# Patient Record
Sex: FEMALE | Race: OTHER | HISPANIC OR LATINO | URBAN - METROPOLITAN AREA
[De-identification: names, ages, dates, MRNs, and addresses within clinical notes are randomized per-mention and may not be internally consistent; named-entity substitution may affect disease eponyms.]

---

## 2018-03-15 ENCOUNTER — EMERGENCY (EMERGENCY)
Facility: HOSPITAL | Age: 48
LOS: 1 days | Discharge: HOME | End: 2018-03-15
Attending: STUDENT IN AN ORGANIZED HEALTH CARE EDUCATION/TRAINING PROGRAM | Admitting: STUDENT IN AN ORGANIZED HEALTH CARE EDUCATION/TRAINING PROGRAM
Payer: SELF-PAY

## 2018-03-15 VITALS
SYSTOLIC BLOOD PRESSURE: 191 MMHG | WEIGHT: 149.91 LBS | HEIGHT: 62.99 IN | DIASTOLIC BLOOD PRESSURE: 95 MMHG | TEMPERATURE: 98 F | HEART RATE: 106 BPM | OXYGEN SATURATION: 99 % | RESPIRATION RATE: 22 BRPM

## 2018-03-15 VITALS
SYSTOLIC BLOOD PRESSURE: 137 MMHG | OXYGEN SATURATION: 97 % | HEART RATE: 119 BPM | RESPIRATION RATE: 20 BRPM | DIASTOLIC BLOOD PRESSURE: 85 MMHG | TEMPERATURE: 98 F

## 2018-03-15 LAB
ALBUMIN SERPL ELPH-MCNC: 4.6 G/DL — SIGNIFICANT CHANGE UP (ref 3.3–5.2)
ALP SERPL-CCNC: 86 U/L — SIGNIFICANT CHANGE UP (ref 40–120)
ALT FLD-CCNC: 38 U/L — HIGH
ANION GAP SERPL CALC-SCNC: 15 MMOL/L — SIGNIFICANT CHANGE UP (ref 5–17)
AST SERPL-CCNC: 24 U/L — SIGNIFICANT CHANGE UP
BASOPHILS # BLD AUTO: 0 K/UL — SIGNIFICANT CHANGE UP (ref 0–0.2)
BASOPHILS NFR BLD AUTO: 0.2 % — SIGNIFICANT CHANGE UP (ref 0–2)
BILIRUB SERPL-MCNC: 0.4 MG/DL — SIGNIFICANT CHANGE UP (ref 0.4–2)
BUN SERPL-MCNC: 11 MG/DL — SIGNIFICANT CHANGE UP (ref 8–20)
CALCIUM SERPL-MCNC: 9.5 MG/DL — SIGNIFICANT CHANGE UP (ref 8.6–10.2)
CHLORIDE SERPL-SCNC: 104 MMOL/L — SIGNIFICANT CHANGE UP (ref 98–107)
CK SERPL-CCNC: 106 U/L — SIGNIFICANT CHANGE UP (ref 25–170)
CO2 SERPL-SCNC: 24 MMOL/L — SIGNIFICANT CHANGE UP (ref 22–29)
CREAT SERPL-MCNC: 0.8 MG/DL — SIGNIFICANT CHANGE UP (ref 0.5–1.3)
CRP SERPL-MCNC: <0.4 MG/DL — SIGNIFICANT CHANGE UP (ref 0–0.4)
D DIMER BLD IA.RAPID-MCNC: <150 NG/ML DDU — SIGNIFICANT CHANGE UP
EOSINOPHIL # BLD AUTO: 0.1 K/UL — SIGNIFICANT CHANGE UP (ref 0–0.5)
EOSINOPHIL NFR BLD AUTO: 1.6 % — SIGNIFICANT CHANGE UP (ref 0–6)
GLUCOSE SERPL-MCNC: 119 MG/DL — HIGH (ref 70–115)
HCT VFR BLD CALC: 39.7 % — SIGNIFICANT CHANGE UP (ref 37–47)
HGB BLD-MCNC: 13 G/DL — SIGNIFICANT CHANGE UP (ref 12–16)
LIDOCAIN IGE QN: 40 U/L — SIGNIFICANT CHANGE UP (ref 22–51)
LYMPHOCYTES # BLD AUTO: 1.3 K/UL — SIGNIFICANT CHANGE UP (ref 1–4.8)
LYMPHOCYTES # BLD AUTO: 21 % — SIGNIFICANT CHANGE UP (ref 20–55)
MAGNESIUM SERPL-MCNC: 2.1 MG/DL — SIGNIFICANT CHANGE UP (ref 1.6–2.6)
MCHC RBC-ENTMCNC: 29.5 PG — SIGNIFICANT CHANGE UP (ref 27–31)
MCHC RBC-ENTMCNC: 32.7 G/DL — SIGNIFICANT CHANGE UP (ref 32–36)
MCV RBC AUTO: 90.2 FL — SIGNIFICANT CHANGE UP (ref 81–99)
MONOCYTES # BLD AUTO: 0.3 K/UL — SIGNIFICANT CHANGE UP (ref 0–0.8)
MONOCYTES NFR BLD AUTO: 5.4 % — SIGNIFICANT CHANGE UP (ref 3–10)
NEUTROPHILS # BLD AUTO: 4.5 K/UL — SIGNIFICANT CHANGE UP (ref 1.8–8)
NEUTROPHILS NFR BLD AUTO: 71.3 % — SIGNIFICANT CHANGE UP (ref 37–73)
NT-PROBNP SERPL-SCNC: 35 PG/ML — SIGNIFICANT CHANGE UP (ref 0–300)
PLATELET # BLD AUTO: 331 K/UL — SIGNIFICANT CHANGE UP (ref 150–400)
POTASSIUM SERPL-MCNC: 4 MMOL/L — SIGNIFICANT CHANGE UP (ref 3.5–5.3)
POTASSIUM SERPL-SCNC: 4 MMOL/L — SIGNIFICANT CHANGE UP (ref 3.5–5.3)
PROT SERPL-MCNC: 8.1 G/DL — SIGNIFICANT CHANGE UP (ref 6.6–8.7)
RBC # BLD: 4.4 M/UL — SIGNIFICANT CHANGE UP (ref 4.4–5.2)
RBC # FLD: 12.8 % — SIGNIFICANT CHANGE UP (ref 11–15.6)
SODIUM SERPL-SCNC: 143 MMOL/L — SIGNIFICANT CHANGE UP (ref 135–145)
TROPONIN T SERPL-MCNC: <0.01 NG/ML — SIGNIFICANT CHANGE UP (ref 0–0.06)
WBC # BLD: 6.3 K/UL — SIGNIFICANT CHANGE UP (ref 4.8–10.8)
WBC # FLD AUTO: 6.3 K/UL — SIGNIFICANT CHANGE UP (ref 4.8–10.8)

## 2018-03-15 PROCEDURE — 99285 EMERGENCY DEPT VISIT HI MDM: CPT | Mod: 25

## 2018-03-15 PROCEDURE — 85027 COMPLETE CBC AUTOMATED: CPT

## 2018-03-15 PROCEDURE — 36415 COLL VENOUS BLD VENIPUNCTURE: CPT

## 2018-03-15 PROCEDURE — 83690 ASSAY OF LIPASE: CPT

## 2018-03-15 PROCEDURE — 80053 COMPREHEN METABOLIC PANEL: CPT

## 2018-03-15 PROCEDURE — 71046 X-RAY EXAM CHEST 2 VIEWS: CPT | Mod: 26

## 2018-03-15 PROCEDURE — 93005 ELECTROCARDIOGRAM TRACING: CPT

## 2018-03-15 PROCEDURE — 99053 MED SERV 10PM-8AM 24 HR FAC: CPT

## 2018-03-15 PROCEDURE — 83735 ASSAY OF MAGNESIUM: CPT

## 2018-03-15 PROCEDURE — 83880 ASSAY OF NATRIURETIC PEPTIDE: CPT

## 2018-03-15 PROCEDURE — 93010 ELECTROCARDIOGRAM REPORT: CPT

## 2018-03-15 PROCEDURE — 86140 C-REACTIVE PROTEIN: CPT

## 2018-03-15 PROCEDURE — 71046 X-RAY EXAM CHEST 2 VIEWS: CPT

## 2018-03-15 PROCEDURE — 82550 ASSAY OF CK (CPK): CPT

## 2018-03-15 PROCEDURE — 85379 FIBRIN DEGRADATION QUANT: CPT

## 2018-03-15 PROCEDURE — 84484 ASSAY OF TROPONIN QUANT: CPT

## 2018-03-15 PROCEDURE — 99284 EMERGENCY DEPT VISIT MOD MDM: CPT | Mod: 25

## 2018-03-15 RX ORDER — SODIUM CHLORIDE 9 MG/ML
3 INJECTION INTRAMUSCULAR; INTRAVENOUS; SUBCUTANEOUS ONCE
Qty: 0 | Refills: 0 | Status: COMPLETED | OUTPATIENT
Start: 2018-03-15 | End: 2018-03-15

## 2018-03-15 RX ADMIN — SODIUM CHLORIDE 3 MILLILITER(S): 9 INJECTION INTRAMUSCULAR; INTRAVENOUS; SUBCUTANEOUS at 03:33

## 2018-03-15 NOTE — ED PROVIDER NOTE - MEDICAL DECISION MAKING DETAILS
Will evaluate patients palpitations r/o acute cardiac injury. If normal pt to be discharge and f/u with cardiology out patient.

## 2018-03-15 NOTE — ED ADULT NURSE NOTE - CHPI ED SYMPTOMS NEG
no vomiting/no back pain/no chills/no diaphoresis/no syncope/no nausea/no shortness of breath/no cough/no fever/no dizziness

## 2018-03-15 NOTE — ED ADULT NURSE NOTE - OBJECTIVE STATEMENT
Pt. presents to ED with c/o palpitations onset appx 2 days ago. Pt. traveled from [facility] 3 months ago, denies any chest pain. admits to occasional headache. Palpitations bernstein not radiate. no other complaints at this time.

## 2018-03-15 NOTE — ED PROVIDER NOTE - OBJECTIVE STATEMENT
This is a 48 y/o F presents to ED c/o intermittent palpitations for the past two days but worsening today. States there are no alleviating or exacerbating factors. Reports she was trying to go to sleep but she felt palpitations onset and became diaphoretic with a mild HA prompting her presentation to ED. Son at bedside notes approximately 8 months ago pt was admitted to a hospital in [facility] for tachycardia and having an elevated BP. She was told her doctor had her on too high of a dosage for one of her medications. Pt is a non-drinker and non-smoker. Denies having recent medication change, diet change N/V/D, fever, chills, SOB, CP, difficulty breathing, HA, numbness, tingling and abd pain.   : Sanjuanita This is a 48 y/o F presents to ED c/o intermittent ( but more persistent) palpitations for the past two days but worsening today. States there are no alleviating or exacerbating factors. Reports she was trying to go to sleep but she felt palpitations onset and became diaphoretic with a mild HA prompting her presentation to ED. Son at bedside notes approximately 8 months ago pt was admitted to a hospital in [facility] for tachycardia and having an elevated BP. She was told her doctor had her on too high of a dosage for one of her medications. Pt is a non-drinker and non-smoker. Denies having recent medication change, diet change N/V/D, fever, chills, SOB, CP, difficulty breathing, HA, numbness, tingling and abd pain.   : Sanjuanita

## 2018-05-09 NOTE — ED ADULT NURSE NOTE - EXTENSIONS OF SELF_ADULT
[facility]  Mild-to-moderate sensory loss: patient feels pinprick is less sharp or is dull on the affected side: or there is a loss of superficial pain with pinprick, but patient is aware of being touched  9. Best Language: 0-->No aphasia: normal  10. Dysarthria: 1-->Mild-to-moderate dysarthria: patient slurs at least some words and, at worst, can be understood with some difficulty  11. Extinction and Inattention (formerly Neglect): 0-->No abnormality  Total (NIH Stroke Scale): 12       Modified [place] Score: 0  Darline Coma Scale:    ABCD2 Score:    [id number] Lina Henderson PA-C  Comprehensive Stroke Center  Department of Vascular Neurology   [facility]  [place]       None

## 2023-04-24 NOTE — ED PROVIDER NOTE - NSCAREINITIATED _GEN_ER
Will demonstrate purposeful and predictable thoughts/behaviors by making a request
Will demonstrate purposeful and predictable thoughts/behaviors by making a request
Erica Bronson(Attending)

## 2024-11-11 NOTE — ED PROVIDER NOTE - PROGRESS NOTE DETAILS
Subjective   Megan Hunter is a 56 y.o. female. Patient is here today for   Chief Complaint   Patient presents with    Hypertension    Leg Pain     Right leg   Answers submitted by the patient for this visit:  Primary Reason for Visit (Submitted on 11/10/2024)  What is the primary reason for your visit?: Extremity Pain  Lower Extremity Injury Questionnaire (Submitted on 11/10/2024)  Chief Complaint: Extremity pain  Injury: No           Vitals:    11/11/24 0918   BP: 120/80   Pulse: 80   Resp: 16   SpO2: 98%     Body mass index is 36.31 kg/m².  The following portions of the patient's history were reviewed and updated as appropriate: allergies, current medications, past family history, past medical history, past social history, past surgical history and problem list.    Past Medical History:   Diagnosis Date    Allergic     Anxiety     Arthritis     Colon polyp 2021    Colon resection surgery 7/7/21    History of medical problems 1996    Miscarriage; had a d&c    Hypertension       No Known Allergies   Social History     Socioeconomic History    Marital status:    Tobacco Use    Smoking status: Never     Passive exposure: Never    Smokeless tobacco: Never   Vaping Use    Vaping status: Never Used   Substance and Sexual Activity    Alcohol use: Yes     Comment: Rarely drink alcohol; consume 1 drink a couple times / year    Drug use: Never    Sexual activity: Yes     Partners: Male     Birth control/protection: Condom, Post-menopausal, None        Current Outpatient Medications:     atorvastatin (LIPITOR) 10 MG tablet, Take 1 tablet by mouth Daily., Disp: 30 tablet, Rfl: 5    cetirizine (zyrTEC) 5 MG tablet, Take 2 tablets by mouth Daily., Disp: , Rfl:     lisinopril (PRINIVIL,ZESTRIL) 20 MG tablet, Take 1 tablet by mouth Daily., Disp: 90 tablet, Rfl: 1    sertraline (ZOLOFT) 25 MG tablet, Take 1 tablet by mouth Daily., Disp: 90 tablet, Rfl: 1         History of Present Illness  Mary is here for a 6 month follow  up for HTN and HLD. She has been tolerating atorvastatin without difficulty and side effects.   She c/o right leg pain for 4-6 months. She denies any known injury. It often goes from the top of her leg to her knee , to her calf. She denies numbness, tingling, weakness, and back pain. She works at [employer] and is on her feet daily. It is worse when she stands for long periods     Review of Systems   Constitutional:  Negative for fatigue.   Respiratory: Negative.     Cardiovascular:  Negative for chest pain and leg swelling.   Musculoskeletal:  Negative for back pain and joint swelling.        Right leg pain    Neurological:  Negative for numbness.     Objective   Physical Exam  Vitals and nursing note reviewed.   Constitutional:       General: She is not in acute distress.  HENT:      Head: Normocephalic.   Cardiovascular:      Rate and Rhythm: Normal rate and regular rhythm.      Pulses:           Dorsalis pedis pulses are 2+ on the right side.        Posterior tibial pulses are 2+ on the right side.      Heart sounds: Normal heart sounds.      Comments: Varicose veins noted on right leg   Pulmonary:      Effort: Pulmonary effort is normal.      Breath sounds: Normal breath sounds.   Musculoskeletal:      Right hip: Normal.      Right upper leg: Normal.      Right knee: Crepitus present. No swelling or deformity. No tenderness.      Right lower leg: No edema.      Left lower leg: No edema.   Neurological:      Mental Status: She is alert.         Assessment    ASSESSMENT    Diagnoses and all orders for this visit:    1. Right leg pain (Primary)  -     Ambulatory Referral to Orthopedic Surgery    2. Chronic pain of right knee  -     Ambulatory Referral to Orthopedic Surgery    3. Essential hypertension  -     Comprehensive Metabolic Panel    4. Mixed hyperlipidemia  -     Lipid Panel With LDL / HDL Ratio            PLAN  HTN well controlled on lisinopril  HLD- recently started on statin , will check FLP  today  Anxiety and depression are well controlled on sertraline   For chronic leg pain and knee pain, offered referral to PT but she declined. Referral placed to ortho. Will wait on xrays today until seen by ortho. Recommend compression stockings       Return in about 6 months (around 5/11/2025) for Annual physical. Or sooner if needed     Patient or patient representative verbalized consent for the use of Ambient Listening during the visit with  CLAYTON Rodas for chart documentation. 11/11/2024  09:35 EST   Patient resting comfortably. Labs are as noted. Will have to follow-up with cardiology for continued symptoms.

## 2024-12-09 NOTE — ED ADULT TRIAGE NOTE - WEIGHT IN KG
Pt's son calling to get pt's recent lab results. He states he has been messaging through the [facility] and was advised by Mercedes and Darrick that his dad doesn't have any lab results in the system.    Pt was seen on 12/02/24 by Dr. Cain, at that visit he went down to have his lab draw here at our [facility]. Please call pt's son Zuhair AGUILAR to advise.    68

## 2025-04-23 NOTE — ED PROVIDER NOTE - TOBACCO USE
Pt planned for dc today on po antibiotics. Pt endorses not feeling well in her stomach, decreased appetite - states she wants to be monitored another day. Discussed with attending Dr. Tarango, will hold off on dc and f/u AM labs. Never smoker